# Patient Record
Sex: FEMALE | Race: WHITE | Employment: OTHER | RURAL
[De-identification: names, ages, dates, MRNs, and addresses within clinical notes are randomized per-mention and may not be internally consistent; named-entity substitution may affect disease eponyms.]

---

## 2017-07-18 PROBLEM — F90.0 ATTENTION DEFICIT HYPERACTIVITY DISORDER, INATTENTIVE TYPE: Status: ACTIVE | Noted: 2017-07-18

## 2017-07-18 PROBLEM — F33.0 MAJOR DEPRESSIVE DISORDER, RECURRENT, MILD (HCC): Status: ACTIVE | Noted: 2017-07-18

## 2021-03-10 ENCOUNTER — HOSPITAL ENCOUNTER (OUTPATIENT)
Dept: BEHAVIORAL/MENTAL HEALTH | Age: 56
Discharge: HOME | End: 2021-03-10

## 2021-03-10 DIAGNOSIS — F90.0 ATTENTION DEFICIT HYPERACTIVITY DISORDER, INATTENTIVE TYPE: ICD-10-CM

## 2021-03-10 RX ORDER — BUPROPION HYDROCHLORIDE 300 MG/1
300 TABLET ORAL
Qty: 30 TAB | Refills: 2 | Status: SHIPPED | OUTPATIENT
Start: 2021-03-10 | End: 2021-06-02 | Stop reason: SDUPTHER

## 2021-03-10 RX ORDER — DEXTROAMPHETAMINE SACCHARATE, AMPHETAMINE ASPARTATE, DEXTROAMPHETAMINE SULFATE AND AMPHETAMINE SULFATE 5; 5; 5; 5 MG/1; MG/1; MG/1; MG/1
20 TABLET ORAL 2 TIMES DAILY
Qty: 60 TAB | Refills: 0 | Status: SHIPPED | OUTPATIENT
Start: 2021-03-10 | End: 2021-06-17 | Stop reason: SDUPTHER

## 2021-03-10 RX ORDER — TRAZODONE HYDROCHLORIDE 50 MG/1
50 TABLET ORAL
Qty: 30 TAB | Refills: 2 | Status: SHIPPED | OUTPATIENT
Start: 2021-03-10 | End: 2021-06-17

## 2021-03-10 RX ORDER — DEXTROAMPHETAMINE SACCHARATE, AMPHETAMINE ASPARTATE, DEXTROAMPHETAMINE SULFATE AND AMPHETAMINE SULFATE 5; 5; 5; 5 MG/1; MG/1; MG/1; MG/1
20 TABLET ORAL 2 TIMES DAILY
Qty: 60 TAB | Refills: 0 | Status: SHIPPED | OUTPATIENT
Start: 2021-03-10 | End: 2021-06-02 | Stop reason: SDUPTHER

## 2021-03-10 NOTE — PROGRESS NOTES
Consent: The patient and/or their healthcare decision maker is aware that they may receive a bill for this audio only encounter, depending on their insurance coverage, and has provided verbal consent to proceed: Yes.     INTERVAL HISTORY (Audio Only):  Ms. Gaston Kilgore is a 73-year-old  white female following up with me 7 weeks since her last appointment regarding a history of Major Depression for which she has had some very mild, residual symptoms since the time of her initial appointment in 7/2016. Charles Granados also has a documented history of ADHD based on prior testing and treatment, as well as a remote history of polysubstance dependence for which she has been completely clean for many years. Previously, I increased her Prozac from 10 to 30 mg daily, but then cut back to 20 mg due to possible serotonin syndrome sx's such as lightheadedness, dizziness, flushed/hot feelings, and mild diaphoresis (sx's went away). We also tried changing the Adderall to Ritalin briefly due to some tolerance issues with Adderall, but it was less effective. Later on, we stopped the Wellbutrin due to constipation (?) and started NTP which seems to have helped, but she went off both AD's due to heat intolerance, and she didn't want to restart them at two follow up appointments, despite feeling slightly more dysphoric. However, several months ago she was more depressed and agreed to start Zoloft (SE's) and Vistaril (helped) PRN for anxiety. Last time we changed the Zoloft to Wellbutrin XL      She states that she's been feeling \"a little bit better\" affectively since starting the Wellbutrin, but the improvement has only been mild at best. She's still fairly flat and anergic, and she's also at least moderately anhedonic.  Her N/V functioning is still somewhat impaired, overall--anergic, slightly withdrawn, poor concentration, negative thinking, etc. However, it's not as bad as the last time and her level of dysphoria is slightly less intense as well. She denies any SE's with the WB XL other than mild constipation (but she's on opiates) and we discussed Rx options going forward. Will increase the Wellbutrin XL to 300 mg daily. Also states that the Vistaril makes her feel foggy the next AM so we'll change to Trazodone. She still only takes the Adderall about once/day and still has #10 pills remaining form the last refill (#60) which was 7 weeks ago.    Shy Jaskaran  CURRENT MEDICATIONS:  1. Adderall 20 mg BID--takes 1/day and doesn't take this when she goes out on the boat/in the heat   2. Wellbutrin  mg daily  3. Vistaril 25 mg bid prn--taking 4-5/week on average  4. Oxy 10 mg qid prn--through a pain mgmt clinic       MENTAL STATUS EXAM:  Ms. Fidelina Leonardo was noted to be very pleasant and cooperative, but again exhibited a slightly restricted affective range, although less so than the last time. She reports still having some dysphoria and depressive sx's, but they are less severe now. She also still reports some increased levels of anxiety, but that's also less intense. She still endorses having some neurovegetative dysfunction, as noted above but she denied any feelings of hopelessness or suicidal thinking. There was no evidence of any bk or hypomania, nor any symptoms of psychosis at all. Lauri Palma judgment and insight appeared to be adequate, and her fund of knowledge appeared to be at least average with no deficits or decline noted. Concentration was fair at best, overall.      DIAGNOSTIC IMPRESSION:  Axis I:   Major depression, recurrent, mild (F33.0).             Attention deficit hyperactivity disorder, inattentive type (F90.0).             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 Axis II:   Deferred. Axis III:  Uterine fibroids; hyperlipidemia, chronic neck and back pain, possible diabetes mellitus.      PLAN:   1. Increase the Wellbutrin XL to 300 mg daily--#30 with 2 refills (30 day).   2. Continue the Adderall 20 mg bid--#60 dated 3/10 and 4/21/21.   3. D/C the  and start Trazodone at 50 mg qhs prn--#30 with 2 refills. 4. Continue therapy with [facility]. 5. Follow up with me in 3 months, sooner if needed.     I affirm this is a Patient-Initiated-Episode with a patient who has not had a related appointment within my department in the past 7 days or scheduled within the next 24 hours. Total Time: 19 minutes  Note: not billable if the call serves to triage the patient into an appointment for the relevant concern. Patient was evaluated by phone call and had no access to a computer or smart phone. Chart reviewed. Evaluated and management per the note above. POS: patient at home; provider in office.

## 2021-06-02 DIAGNOSIS — F90.0 ATTENTION DEFICIT HYPERACTIVITY DISORDER, INATTENTIVE TYPE: ICD-10-CM

## 2021-06-02 RX ORDER — BUPROPION HYDROCHLORIDE 300 MG/1
300 TABLET ORAL
Qty: 30 TABLET | Refills: 0 | Status: SHIPPED | OUTPATIENT
Start: 2021-06-02 | End: 2021-06-17 | Stop reason: SDUPTHER

## 2021-06-02 RX ORDER — DEXTROAMPHETAMINE SACCHARATE, AMPHETAMINE ASPARTATE, DEXTROAMPHETAMINE SULFATE AND AMPHETAMINE SULFATE 5; 5; 5; 5 MG/1; MG/1; MG/1; MG/1
20 TABLET ORAL 2 TIMES DAILY
Qty: 60 TABLET | Refills: 0 | Status: SHIPPED | OUTPATIENT
Start: 2021-06-02 | End: 2021-06-17 | Stop reason: SDUPTHER

## 2021-06-17 ENCOUNTER — HOSPITAL ENCOUNTER (OUTPATIENT)
Dept: BEHAVIORAL/MENTAL HEALTH | Age: 56
Discharge: HOME | End: 2021-06-17
Payer: MEDICARE

## 2021-06-17 DIAGNOSIS — F90.0 ATTENTION DEFICIT HYPERACTIVITY DISORDER, INATTENTIVE TYPE: ICD-10-CM

## 2021-06-17 PROCEDURE — 99443 PR PHYS/QHP TELEPHONE EVALUATION 21-30 MIN: CPT | Performed by: PSYCHIATRY & NEUROLOGY

## 2021-06-17 RX ORDER — DEXTROAMPHETAMINE SACCHARATE, AMPHETAMINE ASPARTATE, DEXTROAMPHETAMINE SULFATE AND AMPHETAMINE SULFATE 5; 5; 5; 5 MG/1; MG/1; MG/1; MG/1
20 TABLET ORAL 2 TIMES DAILY
Qty: 60 TABLET | Refills: 0 | Status: SHIPPED | OUTPATIENT
Start: 2021-09-02 | End: 2021-12-14 | Stop reason: SDUPTHER

## 2021-06-17 RX ORDER — BUPROPION HYDROCHLORIDE 300 MG/1
300 TABLET ORAL
Qty: 30 TABLET | Refills: 2 | Status: SHIPPED | OUTPATIENT
Start: 2021-06-17 | End: 2021-09-16 | Stop reason: SDUPTHER

## 2021-06-17 RX ORDER — DEXTROAMPHETAMINE SACCHARATE, AMPHETAMINE ASPARTATE, DEXTROAMPHETAMINE SULFATE AND AMPHETAMINE SULFATE 5; 5; 5; 5 MG/1; MG/1; MG/1; MG/1
20 TABLET ORAL 2 TIMES DAILY
Qty: 60 TABLET | Refills: 0 | Status: SHIPPED | OUTPATIENT
Start: 2021-07-02 | End: 2021-12-14 | Stop reason: SDUPTHER

## 2021-06-17 RX ORDER — DEXTROAMPHETAMINE SACCHARATE, AMPHETAMINE ASPARTATE, DEXTROAMPHETAMINE SULFATE AND AMPHETAMINE SULFATE 5; 5; 5; 5 MG/1; MG/1; MG/1; MG/1
20 TABLET ORAL 2 TIMES DAILY
Qty: 60 TABLET | Refills: 0 | Status: SHIPPED | OUTPATIENT
Start: 2021-08-02 | End: 2021-12-14 | Stop reason: SDUPTHER

## 2021-06-17 RX ORDER — ESCITALOPRAM OXALATE 5 MG/1
5 TABLET ORAL DAILY
Qty: 30 TABLET | Refills: 2 | Status: SHIPPED | OUTPATIENT
Start: 2021-06-17 | End: 2021-09-16 | Stop reason: SDUPTHER

## 2021-06-17 NOTE — PROGRESS NOTES
Consent: The patient and/or their healthcare decision maker is aware that they may receive a bill for this audio only encounter, depending on their insurance coverage, and has provided verbal consent to proceed: Yes.     INTERVAL HISTORY (Audio Only):  Ms. Regine Aaron is a 57-year-old  white female following up with me over 3 months since her last appointment regarding a history of Major Depression for which she has had some very mild, residual symptoms since the time of her initial appointment in 7/2016. Heena Salinas also has a documented history of ADHD based on prior testing and treatment, as well as a remote history of polysubstance dependence for which she has been completely clean for many years. Previously, I increased her Prozac from 10 to 30 mg daily, but then cut back to 20 mg due to possible serotonin syndrome sx's such as lightheadedness, dizziness, flushed/hot feelings, and mild diaphoresis (sx's went away). We also tried changing the Adderall to Ritalin briefly due to some tolerance issues with Adderall, but it was less effective. Later on, we stopped the Wellbutrin due to constipation (?) and started NTP which seems to have helped, but she went off both AD's due to heat intolerance, and she didn't want to restart them at two follow up appointments, despite feeling slightly more dysphoric. However, 6 months ago she was more depressed and agreed to start Zoloft (SE's) and Vistaril (helped) PRN for anxiety. We then changed the Zoloft to WB XL, and increased the dose the last time.      She states that she's been feeling \"pretty good\" overall, although she's noticed that she's having more difficulty in social settings where she doesn't feeling comfortable talking with others and that she also has little to no desire to socialize. We discussed this further and it's likely due to some mild residual depression and perhaps some anxiety as well.  Her N/V functioning is only mildly impaired--slightly anergic, withdrawn, poor concentration, negative thinking, etc. We discussed Rx options going forward and she's agreeable to augmenting with low-dose Lexapro, despite some of the SE's she's hd with SSRI's before. Not taking the Trazodone at all but she's using the Adderall a little more regularly.         CURRENT MEDICATIONS:  1. Adderall 20 mg BID--takes 1/day and doesn't take this when she goes out on the boat/in the heat   2. Wellbutrin  mg daily  3.  50 mg qhs prn--not taking this  4. Oxy 10 mg qid prn--through a pain mgmt clinic       MENTAL STATUS EXAM:  Ms. Elijah Parra was noted to be very pleasant and cooperative, but again exhibited a slightly restricted affective range, although not severely so. She reports still having some mild dysphoria and depressive sx's, as noted above. She also still reports some increased levels of anxiety, but that's also less intense. She still endorses having some mild neurovegetative dysfunction, as noted above but she denied any feelings of hopelessness or suicidal thinking. There was no evidence of any bk or hypomania, nor any symptoms of psychosis at all. CARLOTTA DOMINGO [facility] judgment and insight appeared to be adequate, and her fund of knowledge appeared to be at least average with no deficits or decline noted. Concentration was fair at best, overall.      DIAGNOSTIC IMPRESSION:  Axis I:   Major Depression, recurrent, mild (F33.0).             Attention deficit hyperactivity disorder, inattentive type (F90.0).             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 Axis II:   Deferred. Axis III:  Uterine fibroids; hyperlipidemia, chronic neck and back pain, possible diabetes mellitus.      PLAN:   1. Augment with low-dose Lexapro at 5 mg daily--#30 with 2 refills (30 day). 2. Continue the Wellbutrin XL at 300 mg daily--#30 with 2 refills (30 day). 3. Continue the Adderall at 20 mg bid--#60 dated 7/2, 8/2, and 9/2/21.    4. Continue therapy with [facility]. 5. Follow up with me in 3 months, sooner if needed.     I affirm this is a Patient-Initiated-Episode with a patient who has not had a related appointment within my department in the past 7 days or scheduled within the next 24 hours. Total Time: 24 minutes  Note: not billable if the call serves to triage the patient into an appointment for the relevant concern. Patient was evaluated by phone call and had no access to a computer or smart phone. Chart reviewed. Evaluated and management per the note above. POS: patient at home; provider in office.

## 2021-09-16 ENCOUNTER — HOSPITAL ENCOUNTER (OUTPATIENT)
Dept: BEHAVIORAL/MENTAL HEALTH | Age: 56
Discharge: HOME | End: 2021-09-16
Payer: MEDICARE

## 2021-09-16 PROCEDURE — 99442 PR PHYS/QHP TELEPHONE EVALUATION 11-20 MIN: CPT | Performed by: PSYCHIATRY & NEUROLOGY

## 2021-09-16 RX ORDER — BUPROPION HYDROCHLORIDE 300 MG/1
300 TABLET ORAL
Qty: 30 TABLET | Refills: 2 | Status: SHIPPED | OUTPATIENT
Start: 2021-09-16 | End: 2021-12-14 | Stop reason: SDUPTHER

## 2021-09-16 RX ORDER — PROPRANOLOL HYDROCHLORIDE 20 MG/1
20 TABLET ORAL
Qty: 20 TABLET | Refills: 1 | Status: SHIPPED | OUTPATIENT
Start: 2021-09-16 | End: 2022-04-14

## 2021-09-16 RX ORDER — ESCITALOPRAM OXALATE 5 MG/1
5 TABLET ORAL DAILY
Qty: 30 TABLET | Refills: 2 | Status: SHIPPED | OUTPATIENT
Start: 2021-09-16 | End: 2021-12-14 | Stop reason: SDUPTHER

## 2021-09-16 NOTE — PROGRESS NOTES
Consent: The patient and/or their healthcare decision maker is aware that they may receive a bill for this audio only encounter, depending on their insurance coverage, and has provided verbal consent to proceed: Yes.     INTERVAL HISTORY (Audio Only):  Ms. Krystal Hahn is a 78-year-old  white female following up with me 3 months since her last appointment regarding a history of Major Depression for which she has had some very mild, residual symptoms since the time of her initial appointment in 7/2016. Radha Rebolledo also has a documented history of ADHD based on prior testing and treatment, as well as a remote history of polysubstance dependence for which she has been completely clean for many years. Previously, I increased her Prozac from 10 to 30 mg daily, but then cut back to 20 mg due to possible serotonin syndrome sx's such as lightheadedness, dizziness, flushed/hot feelings, and mild diaphoresis (sx's went away). We also tried changing the Adderall to Ritalin briefly due to some tolerance issues with Adderall, but it was less effective. Later on, we stopped the Wellbutrin due to constipation (?) and started NTP which seems to have helped, but she went off both AD's due to heat intolerance, and she didn't want to restart them at two follow up appointments, despite feeling slightly more dysphoric. However, 9 months ago she was more depressed and agreed to start Zoloft (SE's) and Vistaril (helped) PRN for anxiety. We then changed the Zoloft back to WB XL, and later increased the dose. Last time, I added a low dose of Lexapro (5 mg) to help with some mild residual dysphoria and social anxiety issues.      She states that she's been feeling \"much better\" than she had been over the past year or so, and that the addition of Lexapro has definitely been helpful with her mood as well as the anxiety sx's.  However, she still has a lot of social anxiety when she has to attend larger gatherings and we discussed the options Rx-wise (no benzo's, SE's with Vistaril, etc.). Her N/V functioning has been fairly good overall, and she denies having any depressive symptoms or issues. She's tolerating the meds well, although she can tell that she can't increase the Lexapro any further without having the serotonergic SE's like before. Taking the Adderall about once/day--last refill was 7/21. Will try Propranolol PRN for anxiety for those social anxiety situations.          CURRENT MEDICATIONS:  1. Adderall 20 mg BID--takes 1/day on average   2. Wellbutrin  mg daily  3. Lexapro 5 mg daily  4. Taking Oxy 10 mg qid prn--through a pain mgmt clinic       MENTAL STATUS EXAM:  Ms. Radha Valladares was noted to be very pleasant and cooperative, with a collado and brighter affective range this time. She denies having any dysphoria or depressive sx's, as noted above. She still reports some increased levels of anxiety, but that's also less severe now. Her neurovegetative function has been adequate, and certainly much better than the last time. She denied any feelings of hopelessness or any suicidal thinking whatsoever. There was no evidence of any bk or hypomania, nor any symptoms of psychosis at all. CARLOTTA DOMINGO [facility] judgment and insight appeared to be adequate, and her fund of knowledge appeared to be at least average with no deficits or decline noted. Concentration was fair at best and she was still easily distracted.      DIAGNOSTIC IMPRESSION:  Axis I:   Major Depression, recurrent, very mild (F33.0).             Attention deficit hyperactivity disorder, inattentive type (F90.0).             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 Axis II:   Deferred. Axis III:  Uterine fibroids; hyperlipidemia, chronic neck and back pain, possible diabetes mellitus.      PLAN:   1. Begin a trial of Propranolol at 20 mg daily prn--#20 with 1 refill. 2. Continue the Lexapro at 5 mg daily--#30 with 2 refills (30 day).   3. Continue the Wellbutrin XL at 300 mg daily--#30 with 2 refills (30 day). 4. Continue the Adderall at 20 mg bid--#60 dated 8/2 and 9/2/21 (both old). 5. Continue therapy with [facility]. 6. Follow up with me in 3 months, sooner if needed.     I affirm this is a Patient-Initiated-Episode with a patient who has not had a related appointment within my department in the past 7 days or scheduled within the next 24 hours. Total Time: 19 minutes  Note: not billable if the call serves to triage the patient into an appointment for the relevant concern. Patient was evaluated by phone call and had no access to a computer or smart phone. Chart reviewed. Evaluated and management per the note above. POS: patient at home; provider in office.

## 2021-12-14 ENCOUNTER — HOSPITAL ENCOUNTER (OUTPATIENT)
Dept: BEHAVIORAL/MENTAL HEALTH | Age: 56
Discharge: HOME | End: 2021-12-14
Payer: MEDICARE

## 2021-12-14 DIAGNOSIS — F90.0 ATTENTION DEFICIT HYPERACTIVITY DISORDER, INATTENTIVE TYPE: ICD-10-CM

## 2021-12-14 PROCEDURE — 99443 PR PHYS/QHP TELEPHONE EVALUATION 21-30 MIN: CPT | Performed by: PSYCHIATRY & NEUROLOGY

## 2021-12-14 RX ORDER — DEXTROAMPHETAMINE SACCHARATE, AMPHETAMINE ASPARTATE, DEXTROAMPHETAMINE SULFATE AND AMPHETAMINE SULFATE 5; 5; 5; 5 MG/1; MG/1; MG/1; MG/1
20 TABLET ORAL 2 TIMES DAILY
Qty: 60 TABLET | Refills: 0 | Status: SHIPPED | OUTPATIENT
Start: 2022-01-14 | End: 2022-04-14 | Stop reason: SDUPTHER

## 2021-12-14 RX ORDER — DEXTROAMPHETAMINE SACCHARATE, AMPHETAMINE ASPARTATE, DEXTROAMPHETAMINE SULFATE AND AMPHETAMINE SULFATE 5; 5; 5; 5 MG/1; MG/1; MG/1; MG/1
20 TABLET ORAL 2 TIMES DAILY
Qty: 60 TABLET | Refills: 0 | Status: SHIPPED | OUTPATIENT
Start: 2022-02-14 | End: 2022-04-14 | Stop reason: SDUPTHER

## 2021-12-14 RX ORDER — BUPROPION HYDROCHLORIDE 300 MG/1
300 TABLET ORAL
Qty: 30 TABLET | Refills: 11 | Status: SHIPPED | OUTPATIENT
Start: 2021-12-14

## 2021-12-14 RX ORDER — DEXTROAMPHETAMINE SACCHARATE, AMPHETAMINE ASPARTATE, DEXTROAMPHETAMINE SULFATE AND AMPHETAMINE SULFATE 5; 5; 5; 5 MG/1; MG/1; MG/1; MG/1
20 TABLET ORAL 2 TIMES DAILY
Qty: 60 TABLET | Refills: 0 | Status: SHIPPED | OUTPATIENT
Start: 2021-12-14 | End: 2022-04-14 | Stop reason: SDUPTHER

## 2021-12-14 RX ORDER — ESCITALOPRAM OXALATE 5 MG/1
5 TABLET ORAL DAILY
Qty: 30 TABLET | Refills: 11 | Status: SHIPPED | OUTPATIENT
Start: 2021-12-14 | End: 2022-08-16 | Stop reason: SDUPTHER

## 2021-12-14 NOTE — PROGRESS NOTES
Consent: The patient and/or their healthcare decision maker is aware that they may receive a bill for this audio only encounter, depending on their insurance coverage, and has provided verbal consent to proceed: Yes.     INTERVAL HISTORY (Audio Only):  Ms. Wes Araujo is a 49-year-old  white female following up with me 3 months since her last appointment regarding a history of Major Depression for which she has had some very mild, residual symptoms since the time of her initial appointment in 7/2016. Jem Olmstead also has a documented history of ADHD based on prior testing and treatment, as well as a remote history of polysubstance dependence for which she has been completely clean for many years. Previously, I increased her Prozac from 10 to 30 mg daily, but then cut back to 20 mg due to possible serotonin syndrome sx's such as lightheadedness, dizziness, flushed/hot feelings, and mild diaphoresis (sx's went away). We also tried changing the Adderall to Ritalin briefly due to some tolerance issues with Adderall, but it was less effective. Later on, we stopped the Wellbutrin due to constipation (?) and started NTP which seems to have helped, but she went off both AD's due to heat intolerance, and she didn't want to restart them at two follow up appointments, despite feeling slightly more dysphoric. However, 12 months ago she was more depressed and agreed to start Zoloft (SE's) and Vistaril (helped) PRN for anxiety. We then changed the Zoloft back to WB XL, and later increased the dose. most recently, I added a low dose of Lexapro (5 mg) to help with some mild residual dysphoria and social anxiety issues and she was \"much better\" the last time. Cruz Farrell  She states that she's still been feeling \"pretty good\" overall, and that she hasn't had any exacerbation of significant depression over the past 3 months despite being very distressed recently because she got rid of a renter (in her home) paying $800/mo so her son could move in, but he backed out and moved into is [email or web address] and she now can't afford to stay in her home and will have to sell it in the near future. Overall, she seems to be coping with it all fairly well, all things considered. She still feels that the addition of Lexapro has definitely been helpful with her mood as well as her anxiety sx's. She's taken the Propranolol only a couple of times and didn't have any anxiety, but she's not sure that the situations were all that anxiety provoking. Her N/V functioning has still been fairly good overall, and she's tolerating the meds well. Taking the Adderall about once/day but slightly more regularly lately.           CURRENT MEDICATIONS:  1. Adderall 20 mg BID--last refilled 7/21 and 11/4  2. Wellbutrin XL 300 mg daily  3. Lexapro 5 mg daily  4. Propranolol 20 mg daily prn--has taken it twice   5. Taking Oxy 10 mg qid prn--through a pain mgmt clinic       MENTAL STATUS EXAM:  Ms. Jasson Klein was noted to be very pleasant and cooperative, with a fairly full affective range this time. She denied having any notable dysphoria or depressive sx's and her level of anxiety has also been manageable. Her neurovegetative function has been adequate and she denied any feelings of hopelessness or any suicidal thinking whatsoever. There was no evidence of any bk or hypomania, nor any symptoms of psychosis at all. CARLOTTA DOMINGO [facility] judgment and insight appeared to be adequate, and her fund of knowledge appeared to be at least average with no deficits or decline noted. Concentration was fair at best and she was still easily distracted.      DIAGNOSTIC IMPRESSION:  Axis I:   Major Depression, recurrent, very mild (F33.0).             Attention deficit hyperactivity disorder, inattentive type (F90.0).             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 Axis II:   Deferred.   Axis III:  Uterine fibroids; hyperlipidemia, chronic neck and back pain, possible diabetes mellitus.      PLAN:   1. Continue the Propranolol at 20 mg daily prn--has 1 refill (#20). 2. Continue the Lexapro at 5 mg daily--#30 with 11 refills (30 day). 3. Continue the Wellbutrin BQ QM 269 EM daily--#30 with 11 refills (30 day). 4. Continue the Adderall at 20 mg bid--#60 dated 12/14, 1/14/22, and 2/14/22. 5. Continue therapy with [facility]. 6. Follow up with me in 4 months, sooner if needed.     I affirm this is a Patient-Initiated-Episode with a patient who has not had a related appointment within my department in the past 7 days or scheduled within the next 24 hours. Total Time: 24 minutes  Note: not billable if the call serves to triage the patient into an appointment for the relevant concern. Patient was evaluated by phone call and had no access to a computer or smart phone. Chart reviewed. Evaluated and management per the note above.  POS: patient at home; provider in office

## 2022-03-19 PROBLEM — F33.0 MAJOR DEPRESSIVE DISORDER, RECURRENT, MILD (HCC): Status: ACTIVE | Noted: 2017-07-18

## 2022-03-19 PROBLEM — F90.0 ATTENTION DEFICIT HYPERACTIVITY DISORDER, INATTENTIVE TYPE: Status: ACTIVE | Noted: 2017-07-18

## 2022-04-14 ENCOUNTER — HOSPITAL ENCOUNTER (OUTPATIENT)
Dept: BEHAVIORAL/MENTAL HEALTH | Age: 57
Discharge: HOME | End: 2022-04-14
Payer: MEDICARE

## 2022-04-14 DIAGNOSIS — F90.0 ATTENTION DEFICIT HYPERACTIVITY DISORDER, INATTENTIVE TYPE: ICD-10-CM

## 2022-04-14 PROCEDURE — 99443 PR PHYS/QHP TELEPHONE EVALUATION 21-30 MIN: CPT | Performed by: PSYCHIATRY & NEUROLOGY

## 2022-04-14 RX ORDER — DEXTROAMPHETAMINE SACCHARATE, AMPHETAMINE ASPARTATE, DEXTROAMPHETAMINE SULFATE AND AMPHETAMINE SULFATE 5; 5; 5; 5 MG/1; MG/1; MG/1; MG/1
20 TABLET ORAL 2 TIMES DAILY
Qty: 60 TABLET | Refills: 0 | Status: SHIPPED | OUTPATIENT
Start: 2022-04-14 | End: 2022-08-16 | Stop reason: SDUPTHER

## 2022-04-14 RX ORDER — DEXTROAMPHETAMINE SACCHARATE, AMPHETAMINE ASPARTATE, DEXTROAMPHETAMINE SULFATE AND AMPHETAMINE SULFATE 5; 5; 5; 5 MG/1; MG/1; MG/1; MG/1
20 TABLET ORAL 2 TIMES DAILY
Qty: 60 TABLET | Refills: 0 | Status: SHIPPED | OUTPATIENT
Start: 2022-06-13 | End: 2022-08-16 | Stop reason: SDUPTHER

## 2022-04-14 RX ORDER — DEXTROAMPHETAMINE SACCHARATE, AMPHETAMINE ASPARTATE, DEXTROAMPHETAMINE SULFATE AND AMPHETAMINE SULFATE 5; 5; 5; 5 MG/1; MG/1; MG/1; MG/1
20 TABLET ORAL 2 TIMES DAILY
Qty: 60 TABLET | Refills: 0 | Status: SHIPPED | OUTPATIENT
Start: 2022-05-14 | End: 2022-08-16 | Stop reason: SDUPTHER

## 2022-04-14 NOTE — PROGRESS NOTES
Consent: The patient and/or their healthcare decision maker is aware that they may receive a bill (including co-pays) for this audio only encounter, depending on their insurance coverage, and has provided verbal consent to proceed. The patient was located in [place], where I am licensed to provide care.      INTERVAL HISTORY (Audio Only):  Ms. Russ Richardson is a 49-year-old  white female following up with me 4 months since her last appointment regarding a history of Major Depression for which she has had some very mild, residual symptoms since the time of her initial appointment in 7/2016. Robin Vallejo also has a documented history of ADHD based on prior testing and treatment, as well as a remote history of polysubstance dependence for which she has been completely clean for many years. Previously, I increased her Prozac from 10 to 30 mg daily, but then cut back to 20 mg due to possible serotonin syndrome sx's such as lightheadedness, dizziness, flushed/hot feelings, and mild diaphoresis (sx's went away). We also tried changing the Adderall to Ritalin briefly due to some tolerance issues with Adderall, but it was less effective. Later on, we stopped the Wellbutrin due to constipation (?) and started NTP which seems to have helped, but she went off both AD's due to heat intolerance, and she didn't want to restart them at two follow up appointments, despite feeling slightly more dysphoric. However, 12 months ago she was more depressed and agreed to start Zoloft (SE's) and Vistaril (helped) PRN for anxiety. We then changed the Zoloft back to WB XL, and later increased the dose. most recently, I added a low dose of Lexapro (5 mg) to help with some mild residual dysphoria and social anxiety issues and she was \"much better\" the last time. Manas Magana  She states that she's still been feeling \"fairly good\" overall, and that she hasn't had any exacerbation of significant depression over the past 4 months.  She's slightly less distressed compared to last time, but she still has some financial concerns. She was able to refinance her home and her payments are much more affordable. She still feels that the addition of Lexapro has definitely been helpful with her mood as well as her anxiety sx's, and she hasn't had to take the Propranolol at all. Her N/V functioning has still been fairly good with only some mild insomnia noted. States she's tolerating the meds well. Taking the Adderall slightly more regularly lately.           CURRENT MEDICATIONS:  1. Adderall 20 mg BID  2. Wellbutrin XL 300 mg daily  3. Lexapro 5 mg daily  4.  20 mg daily prn--not taking this   5. Taking Oxy 10 mg qid prn--through a pain mgmt clinic       MENTAL STATUS EXAM:  Ms. Valerie Henderson was noted to be very pleasant and cooperative, with a fairly full affective range again this time.  She denied having any notable dysphoria or depressive sx's and her level of anxiety has also been manageable. Her neurovegetative function has still been adequate and she denied any feelings of hopelessness or any suicidal thinking at all. There was no evidence of any bk or hypomania, nor any symptoms of psychosis at all. CARLOTTA DOMINGO [facility] judgment and insight appeared to be adequate, and her fund of knowledge appeared to be at least average with no deficits or decline noted. Concentration was fair at best and she was still easily distracted.      DIAGNOSTIC IMPRESSION:  Axis I:   Major Depression, recurrent, very mild (F33.0)              Attention deficit hyperactivity disorder, inattentive type (F90.0)              Opiate Use disorder (F11.20)              Alcohol and THC abuse, in remission over 10 years (F10.21 and F12.21)  Axis II:   Deferred. Axis III:  Uterine fibroids; hyperlipidemia, chronic neck and back pain, possible diabetes mellitus.      PLAN:   1. Continue the Lexapro at 5 mg daily--has 8 months of refills (30 day).   2. Continue the Wellbutrin XL at 300 mg daily--has 8 months of refills (30 day).  3. Continue the Adderall at 20 mg bid--#60 dated 4/14, 5/14, and 6/13/22.   4. Continue therapy with [facility]--hasn't seen her in a month. 5. Follow up with me in 4 months, sooner if needed.     I affirm this is a Patient-Initiated-Episode with a patient who has not had a related appointment within my department in the past 7 days or scheduled within the next 24 hours. Total Time: 25 minutes  Note: not billable if the call serves to triage the patient into an appointment for the relevant concern. Patient was evaluated by phone call and had no access to a computer or smart phone. Chart reviewed. Evaluated and management per the note above. POS: patient at home; provider in office.

## 2022-06-17 LAB — MAMMOGRAPHY, EXTERNAL: NORMAL

## 2022-08-16 ENCOUNTER — HOSPITAL ENCOUNTER (OUTPATIENT)
Dept: BEHAVIORAL/MENTAL HEALTH | Age: 57
Discharge: HOME | End: 2022-08-16
Payer: MEDICARE

## 2022-08-16 DIAGNOSIS — F90.0 ATTENTION DEFICIT HYPERACTIVITY DISORDER, INATTENTIVE TYPE: ICD-10-CM

## 2022-08-16 PROCEDURE — 99443 PR PHYS/QHP TELEPHONE EVALUATION 21-30 MIN: CPT | Performed by: PSYCHIATRY & NEUROLOGY

## 2022-08-16 RX ORDER — DEXTROAMPHETAMINE SACCHARATE, AMPHETAMINE ASPARTATE, DEXTROAMPHETAMINE SULFATE AND AMPHETAMINE SULFATE 5; 5; 5; 5 MG/1; MG/1; MG/1; MG/1
20 TABLET ORAL 2 TIMES DAILY
Qty: 60 TABLET | Refills: 0 | Status: SHIPPED | OUTPATIENT
Start: 2022-09-15

## 2022-08-16 RX ORDER — ESCITALOPRAM OXALATE 10 MG/1
10 TABLET ORAL DAILY
Qty: 30 TABLET | Refills: 3 | Status: SHIPPED | OUTPATIENT
Start: 2022-08-16

## 2022-08-16 RX ORDER — DEXTROAMPHETAMINE SACCHARATE, AMPHETAMINE ASPARTATE, DEXTROAMPHETAMINE SULFATE AND AMPHETAMINE SULFATE 5; 5; 5; 5 MG/1; MG/1; MG/1; MG/1
20 TABLET ORAL 2 TIMES DAILY
Qty: 60 TABLET | Refills: 0 | Status: SHIPPED | OUTPATIENT
Start: 2022-10-15

## 2022-08-16 RX ORDER — DEXTROAMPHETAMINE SACCHARATE, AMPHETAMINE ASPARTATE, DEXTROAMPHETAMINE SULFATE AND AMPHETAMINE SULFATE 5; 5; 5; 5 MG/1; MG/1; MG/1; MG/1
20 TABLET ORAL 2 TIMES DAILY
Qty: 60 TABLET | Refills: 0 | Status: SHIPPED | OUTPATIENT
Start: 2022-08-16

## 2022-08-16 NOTE — PROGRESS NOTES
Consent: The patient and/or their healthcare decision maker is aware that they may receive a bill (including co-pays) for this audio only encounter, depending on their insurance coverage, and has provided verbal consent to proceed. The patient was located in [place], where I am licensed to provide care. INTERVAL HISTORY (Audio Only):  Ms. Amy Doyle is a 15-year-old  white female following up with me 4 months since her last appointment regarding a history of Major Depression for which she has had some very mild, residual symptoms since the time of her initial appointment in 7/2016. She also has a documented history of ADHD based on prior testing and treatment, as well as a remote history of polysubstance dependence for which she has been completely clean for many years. Previously, I increased her Prozac from 10 to 30 mg daily, but then cut back to 20 mg due to possible serotonin syndrome sx's such as lightheadedness, dizziness, flushed/hot feelings, and mild diaphoresis (sx's went away). We also tried changing the Adderall to Ritalin briefly due to some tolerance issues with Adderall, but it was less effective. Later on, we stopped the Wellbutrin due to constipation (?) and started NTP which seems to have helped, but she went off both AD's due to heat intolerance, and she didn't want to restart them at two follow up appointments, despite feeling slightly more dysphoric. However, 16 months ago she was more depressed and agreed to start Zoloft (SE's) and Vistaril (helped) for anxiety. We then changed the Zoloft back to WB XL, and later increased the dose. More recently, I added a low dose of Lexapro (5 mg) to help with some mild residual dysphoria and social anxiety issues and she was \"much better\" the last 2 times. She states that she's still been feeling \"pretty good\" overall, but that she has \"good days and bad days\" and she's not sure why.  Has had slightly more anxiety, mostly social anxiety--avoids going to stores, being around people, etc. Also has moments of brief dysphoria including anergia, amotivation, and mild anhedonia and dysphoria. She continues to have excessive diaphoresis, and it's actually been slightly worse over time, independent of the meds. We discussed Rx options and she's agreeable to increasing the Lexapro to 10 mg, watching out for any worsening diaphoresis. Her N/V functioning has still been fairly good but the insomnia has been somewhat worse. States she's tolerating the meds well. Taking the Adderall more regularly lately. CURRENT MEDICATIONS:  1. Adderall 20 mg BID  2. Wellbutrin  mg daily  3. Lexapro 5 mg daily  4. Taking Oxy 10 mg qid prn--through a pain mgmt clinic       MENTAL STATUS EXAM:  Ms. Danielle Tolentino was noted to be very pleasant and cooperative, with a fairly full affective range noted again this time. She denied having any severe dysphoria or depressive sx's, but she has had some  periods of mild dysphoria at times. Her anxiety level seems to be slightly worse as well. She denied any feelings of hopelessness or any suicidal thinking at all. There was no evidence of any bk or hypomania, nor any symptoms of psychosis at all. Her judgment and insight appeared to be adequate, and her fund of knowledge appeared to be at least average with no deficits or decline noted. Concentration was fair at best and she was still easily distracted. 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 Axis III:  Uterine fibroids; hyperlipidemia, chronic neck and back pain, possible diabetes mellitus. PLAN:   1. Increase the Lexapro to 10 mg daily--#30 with 3 refills (30 day).   2. Continue the Wellbutrin XL at 300 mg daily--has 4 months of refills (30 day). 3. Continue the Adderall at 20 mg bid--#60 dated 8/16, 9/15, and 10/15/22. 4. Continue therapy with Merryl Stack her intermittently. 5. Follow up with me in 4 months, sooner if needed. I affirm this is a Patient-Initiated-Episode with a patient who has not had a related appointment within my department in the past 7 days or scheduled within the next 24 hours. Total Time: 25 minutes  Note: not billable if the call serves to triage the patient into an appointment for the relevant concern. Patient was evaluated by phone call and had no access to a computer or smart phone. Chart reviewed. Evaluated and management per the note above. POS: patient at home; provider in office.

## 2022-12-13 ENCOUNTER — APPOINTMENT (OUTPATIENT)
Dept: BEHAVIORAL/MENTAL HEALTH | Age: 57
End: 2022-12-13

## 2023-04-05 ENCOUNTER — TRANSCRIBE ORDER (OUTPATIENT)
Dept: SCHEDULING | Age: 58
End: 2023-04-05

## 2023-04-17 ENCOUNTER — HOSPITAL ENCOUNTER (OUTPATIENT)
Dept: MRI IMAGING | Age: 58
Discharge: HOME | End: 2023-04-17
Attending: PAIN MEDICINE
Payer: MEDICARE

## 2023-04-17 DIAGNOSIS — M54.16 RADICULOPATHY, LUMBAR REGION: ICD-10-CM

## 2023-04-17 PROCEDURE — 72148 MRI LUMBAR SPINE W/O DYE: CPT

## 2023-05-22 RX ORDER — DEXTROAMPHETAMINE SACCHARATE, AMPHETAMINE ASPARTATE, DEXTROAMPHETAMINE SULFATE AND AMPHETAMINE SULFATE 5; 5; 5; 5 MG/1; MG/1; MG/1; MG/1
20 TABLET ORAL 2 TIMES DAILY
COMMUNITY
Start: 2022-08-16

## 2023-05-22 RX ORDER — ESCITALOPRAM OXALATE 10 MG/1
10 TABLET ORAL DAILY
COMMUNITY
Start: 2022-08-16

## 2023-05-22 RX ORDER — BUPROPION HYDROCHLORIDE 300 MG/1
1 TABLET ORAL EVERY MORNING
COMMUNITY
Start: 2021-12-14

## 2023-05-22 RX ORDER — CYANOCOBALAMIN 1000 UG/ML
1000 INJECTION, SOLUTION INTRAMUSCULAR; SUBCUTANEOUS ONCE
COMMUNITY

## 2023-05-30 RX ORDER — DEXTROAMPHETAMINE SACCHARATE, AMPHETAMINE ASPARTATE, DEXTROAMPHETAMINE SULFATE AND AMPHETAMINE SULFATE 5; 5; 5; 5 MG/1; MG/1; MG/1; MG/1
20 TABLET ORAL 2 TIMES DAILY
COMMUNITY
Start: 2022-09-15